# Patient Record
Sex: MALE
[De-identification: names, ages, dates, MRNs, and addresses within clinical notes are randomized per-mention and may not be internally consistent; named-entity substitution may affect disease eponyms.]

---

## 2024-08-23 ENCOUNTER — DASHBOARD ENCOUNTERS (OUTPATIENT)
Age: 44
End: 2024-08-23

## 2024-08-23 ENCOUNTER — OFFICE VISIT (OUTPATIENT)
Dept: URBAN - METROPOLITAN AREA CLINIC 9 | Facility: CLINIC | Age: 44
End: 2024-08-23
Payer: COMMERCIAL

## 2024-08-23 VITALS
SYSTOLIC BLOOD PRESSURE: 130 MMHG | BODY MASS INDEX: 33.32 KG/M2 | DIASTOLIC BLOOD PRESSURE: 80 MMHG | WEIGHT: 246 LBS | HEIGHT: 72 IN

## 2024-08-23 DIAGNOSIS — R74.8 ELEVATED LIVER ENZYMES: ICD-10-CM

## 2024-08-23 DIAGNOSIS — K75.9 HEPATITIS: ICD-10-CM

## 2024-08-23 DIAGNOSIS — R74.01 ELEVATED LIVER TRANSAMINASE LEVEL: ICD-10-CM

## 2024-08-23 DIAGNOSIS — K73.9 HEPATITIS, CHRONIC: ICD-10-CM

## 2024-08-23 DIAGNOSIS — E07.9 THYROID DYSFUNCTION: ICD-10-CM

## 2024-08-23 DIAGNOSIS — R79.89 ELEVATED LIVER FUNCTION TESTS: ICD-10-CM

## 2024-08-23 DIAGNOSIS — R94.5 LIVER FUNCTION ABNORMALITY: ICD-10-CM

## 2024-08-23 PROCEDURE — 99204 OFFICE O/P NEW MOD 45 MIN: CPT | Performed by: STUDENT IN AN ORGANIZED HEALTH CARE EDUCATION/TRAINING PROGRAM

## 2024-08-23 RX ORDER — GUAIFENESIN 600 MG/1
1 TABLET AS NEEDED TABLET, EXTENDED RELEASE ORAL
Status: ACTIVE | COMMUNITY

## 2024-08-23 NOTE — HPI-TODAY'S VISIT:
EGD: None  Colonoscopy: None  Imaging/Studies/Procedures: RUQ U/S 1/3/22- hepatomegaly, steatosis 5/22/24- CBC, CMP (, ), lipid panel high, A1c 5.4, TSH normal.  - -   PMH:  None  Family Hx: Cousin on liver transplant list  GI Hx: 8/23/24- Asymptomatic. Reviewed liver labs and prior imaging.

## 2024-08-30 ENCOUNTER — LAB OUTSIDE AN ENCOUNTER (OUTPATIENT)
Dept: URBAN - METROPOLITAN AREA CLINIC 9 | Facility: CLINIC | Age: 44
End: 2024-08-30

## 2024-09-16 ENCOUNTER — TELEPHONE ENCOUNTER (OUTPATIENT)
Dept: URBAN - METROPOLITAN AREA CLINIC 9 | Facility: CLINIC | Age: 44
End: 2024-09-16

## 2024-10-11 ENCOUNTER — OFFICE VISIT (OUTPATIENT)
Dept: URBAN - METROPOLITAN AREA CLINIC 9 | Facility: CLINIC | Age: 44
End: 2024-10-11

## 2024-10-22 ENCOUNTER — WEB ENCOUNTER (OUTPATIENT)
Dept: URBAN - METROPOLITAN AREA CLINIC 9 | Facility: CLINIC | Age: 44
End: 2024-10-22

## 2024-10-23 ENCOUNTER — WEB ENCOUNTER (OUTPATIENT)
Dept: URBAN - METROPOLITAN AREA CLINIC 9 | Facility: CLINIC | Age: 44
End: 2024-10-23

## 2024-11-05 ENCOUNTER — LAB OUTSIDE AN ENCOUNTER (OUTPATIENT)
Dept: URBAN - METROPOLITAN AREA CLINIC 9 | Facility: CLINIC | Age: 44
End: 2024-11-05

## 2024-12-18 ENCOUNTER — OFFICE VISIT (OUTPATIENT)
Dept: URBAN - METROPOLITAN AREA CLINIC 9 | Facility: CLINIC | Age: 44
End: 2024-12-18
Payer: COMMERCIAL

## 2024-12-18 VITALS
WEIGHT: 254 LBS | BODY MASS INDEX: 34.4 KG/M2 | SYSTOLIC BLOOD PRESSURE: 142 MMHG | DIASTOLIC BLOOD PRESSURE: 97 MMHG | HEIGHT: 72 IN

## 2024-12-18 DIAGNOSIS — R74.8 ELEVATED LIVER ENZYMES: ICD-10-CM

## 2024-12-18 DIAGNOSIS — R79.89 ELEVATED FERRITIN: ICD-10-CM

## 2024-12-18 DIAGNOSIS — K76.0 FATTY LIVER: ICD-10-CM

## 2024-12-18 PROCEDURE — 99214 OFFICE O/P EST MOD 30 MIN: CPT | Performed by: STUDENT IN AN ORGANIZED HEALTH CARE EDUCATION/TRAINING PROGRAM

## 2024-12-18 RX ORDER — GUAIFENESIN 600 MG/1
1 TABLET AS NEEDED TABLET, EXTENDED RELEASE ORAL
Status: ACTIVE | COMMUNITY

## 2024-12-18 NOTE — HPI-TODAY'S VISIT:
EGD: None  Colonoscopy: None  Imaging/Studies/Procedures: RUQ U/S 1/3/22- hepatomegaly, steatosis 5/22/24- CBC, CMP (, ), lipid panel high, A1c 5.4, TSH normal. RUQ U/S 8/30/24- 1.  Hepatomegaly slightly increased from prior with steatosis.  Pancreaslimited visualized. 9/2024- CMP with liver enzymes still elevated (AST 68, ), Iron studies (iron 201, 49%, ferritin 431)- suspicious for hemochromatosis- please order hemochromatosis gene lab order under dx of elevated ferritin, iron saturation abnormal. Rest of liver workup: celiac test, ceru, GGT, ASMA, AMA, Alpha 1, GINO, INR, Hep B sAg, Hep C Ab, TSH, all normla. Return to clinic after genetic hemochromatosis test completed 9/24/24- hemochromatosis genetic testing normal. Fibroscan 11/5/24- S3,F0  - -   PMH:  None  Family Hx: Cousin on liver transplant list  GI Hx: 8/23/24- Asymptomatic. Reviewed liver labs and prior imaging. 12/18/24- The patient was diagnosed with fatty liver, a common condition affecting one in four people in the [place]. This diagnosis was made following an ultrasound conducted in August, which showed no significant issues apart from the presence of fatty liver. The patient also underwent a fibroscan, a diagnostic procedure that measures the amount of fat and scar tissue in the liver. The results of the fibroscan indicated a significant amount of fat on the liver, but no fibrosis or scar tissue. This suggests that the condition is in its early stages and is completely reversible with appropriate lifestyle changes. The patient's liver enzymes were found to be elevated, a common occurrence in individuals with fatty liver. A genetic test for hemochromatosis, a condition that can cause liver disease, came back as normal. The patient's ferritin level was also high, which could be a result of inflammation caused by the fatty liver. The patient's cholesterol level is also high, which can contribute to the development of fatty liver. The patient has been advised to follow a low-fat diet, control sugar intake, avoid high-fat content foods, and exercise at least once to three times a week. The patient has also been advised to avoid alcohol. The patient's liver enzymes and fibroscan will be monitored regularly to track the progression of the condition.

## 2025-01-03 ENCOUNTER — TELEPHONE ENCOUNTER (OUTPATIENT)
Dept: URBAN - METROPOLITAN AREA CLINIC 9 | Facility: CLINIC | Age: 45
End: 2025-01-03

## 2025-08-07 ENCOUNTER — OFFICE VISIT (OUTPATIENT)
Dept: URBAN - METROPOLITAN AREA CLINIC 9 | Facility: CLINIC | Age: 45
End: 2025-08-07